# Patient Record
Sex: MALE | Race: WHITE | NOT HISPANIC OR LATINO | Employment: UNEMPLOYED | URBAN - METROPOLITAN AREA
[De-identification: names, ages, dates, MRNs, and addresses within clinical notes are randomized per-mention and may not be internally consistent; named-entity substitution may affect disease eponyms.]

---

## 2022-09-02 ENCOUNTER — HOSPITAL ENCOUNTER (EMERGENCY)
Facility: CLINIC | Age: 27
Discharge: HOME | End: 2022-09-02

## 2022-09-02 VITALS
WEIGHT: 250 LBS | TEMPERATURE: 98.9 F | RESPIRATION RATE: 16 BRPM | BODY MASS INDEX: 35.79 KG/M2 | HEART RATE: 74 BPM | OXYGEN SATURATION: 97 % | DIASTOLIC BLOOD PRESSURE: 83 MMHG | SYSTOLIC BLOOD PRESSURE: 149 MMHG | HEIGHT: 70 IN

## 2022-09-02 NOTE — ED TRIAGE NOTES
R hand swelling, redness extending up mid arm.  Was bit by a possible bee 2 days ago. States had temp of 100.1 yesterday.      Triage Assessment     Row Name 09/02/22 1227       Triage Assessment (Adult)    Airway WDL WDL       Respiratory WDL    Respiratory WDL WDL       Skin Circulation/Temperature WDL    Skin Circulation/Temperature WDL WDL       Cardiac WDL    Cardiac WDL WDL       Peripheral/Neurovascular WDL    Peripheral Neurovascular WDL WDL       Cognitive/Neuro/Behavioral WDL    Cognitive/Neuro/Behavioral WDL WDL

## 2023-08-15 ENCOUNTER — OFFICE VISIT (OUTPATIENT)
Dept: FAMILY MEDICINE | Facility: CLINIC | Age: 28
End: 2023-08-15
Payer: COMMERCIAL

## 2023-08-15 VITALS
RESPIRATION RATE: 16 BRPM | WEIGHT: 266 LBS | DIASTOLIC BLOOD PRESSURE: 79 MMHG | OXYGEN SATURATION: 96 % | SYSTOLIC BLOOD PRESSURE: 124 MMHG | TEMPERATURE: 97.9 F | BODY MASS INDEX: 38.17 KG/M2 | HEART RATE: 76 BPM

## 2023-08-15 DIAGNOSIS — A08.4 VIRAL GASTROENTERITIS: Primary | ICD-10-CM

## 2023-08-15 PROCEDURE — 99203 OFFICE O/P NEW LOW 30 MIN: CPT | Performed by: PHYSICIAN ASSISTANT

## 2023-08-15 RX ORDER — ONDANSETRON 4 MG/1
4 TABLET, ORALLY DISINTEGRATING ORAL EVERY 8 HOURS PRN
Qty: 9 TABLET | Refills: 0 | Status: SHIPPED | OUTPATIENT
Start: 2023-08-15 | End: 2023-08-18

## 2023-08-15 RX ORDER — HYDROXYZINE HYDROCHLORIDE 25 MG/1
50 TABLET, FILM COATED ORAL EVERY 8 HOURS PRN
COMMUNITY
Start: 2023-08-01 | End: 2023-12-11

## 2023-08-15 NOTE — PROGRESS NOTES
URGENT CARE VISIT:    SUBJECTIVE:   Kade Schaefer is a 28 year old male who presents with abdominal pain for 5-6 days. Abdominal pain is located over Generalized and is described as cramping. Pain timing/severity is described as gradual onset and mild and moderate.  Pain is improved by nothing and worsened by eating. Associated symptoms include nausea, vomiting, and diarrhea. He denies constipation, fever, and chills. He has tried pedialyte with no relief of symptoms.  Appetite is decreased. Risk factors include wife with same symptoms after eating out. Abdominal surgical history includes none.    PMH: History reviewed. No pertinent past medical history.  Allergies: Patient has no known allergies.  Medications:   Current Outpatient Medications   Medication Sig Dispense Refill    diclofenac (VOLTAREN) 1 % topical gel Apply 2 g topically 4 times daily      hydrOXYzine (ATARAX) 25 MG tablet Take 50 mg by mouth every 8 hours as needed for itching      omeprazole (PRILOSEC) 20 MG DR capsule Take 20 mg by mouth daily      ondansetron (ZOFRAN ODT) 4 MG ODT tab Take 1 tablet (4 mg) by mouth every 8 hours as needed for nausea 9 tablet 0     Social History:   Social History     Socioeconomic History    Marital status: Single     Spouse name: Not on file    Number of children: Not on file    Years of education: Not on file    Highest education level: Not on file   Occupational History    Not on file   Tobacco Use    Smoking status: Every Day    Smokeless tobacco: Current   Substance and Sexual Activity    Alcohol use: Yes     Comment: social    Drug use: Yes     Types: Marijuana    Sexual activity: Not on file   Other Topics Concern    Not on file   Social History Narrative    Not on file     Social Determinants of Health     Financial Resource Strain: Not on file   Food Insecurity: Not on file   Transportation Needs: Not on file   Physical Activity: Not on file   Stress: Not on file   Social Connections: Not on file    Intimate Partner Violence: Not on file   Housing Stability: Not on file       ROS: ROS otherwise found to be negative except as noted above.     OBJECTIVE:  /79   Pulse 76   Temp 97.9  F (36.6  C)   Resp 16   Wt 120.7 kg (266 lb)   SpO2 96%   BMI 38.17 kg/m    GENERAL APPEARANCE: healthy, alert and no distress  EYES: EOMI,  PERRL, conjunctiva clear  RESP: lungs clear to auscultation - no rales, rhonchi or wheezes  CV: regular rates and rhythm, normal S1 S2, no murmur noted  ABDOMEN: soft, normal bowel sounds, tenderness mild generalized  SKIN: no suspicious lesions or rashes      ASSESSMENT:     ICD-10-CM    1. Viral gastroenteritis  A08.4 ondansetron (ZOFRAN ODT) 4 MG ODT tab           PLAN:  Patient Instructions   Patient was educated on the natural course of condition which lasts about 7 days. Conservative measures discussed including drinking small amounts of fluids (Pedialyte or Gatorade/water mixture), bland diet, avoidance of dairy products, and analgesics (Tylenol) for pain. Advance diet as tolerated. To avoid transmission wash hands with soap/water and clean external house surfaces with bleach water. See your primary care provider if symptoms do not improve in 7 days. Seek emergency care if you develop worsening abdominal pain or fever over 103.  Patient verbalized understanding and is agreeable to plan. The patient was discharged ambulatory and in stable condition.    Katerine Arambula PA-C ....................  8/15/2023   11:00 AM

## 2023-08-15 NOTE — PATIENT INSTRUCTIONS
Patient was educated on the natural course of condition which lasts about 7 days. Conservative measures discussed including drinking small amounts of fluids (Pedialyte or Gatorade/water mixture), bland diet, avoidance of dairy products, and analgesics (Tylenol) for pain. Advance diet as tolerated. To avoid transmission wash hands with soap/water and clean external house surfaces with bleach water. See your primary care provider if symptoms do not improve in 7 days. Seek emergency care if you develop worsening abdominal pain or fever over 103.

## 2023-08-15 NOTE — LETTER
August 15, 2023      Kade Schaefer  [facility] [place] [address]        To Whom It May Concern:    Kade Schaefer  was seen on today.  Please excuse him from work for the next 1 to 3 days due to medical illness.         Sincerely,        Katerine Arambula PA-C

## 2023-08-21 ENCOUNTER — OFFICE VISIT (OUTPATIENT)
Dept: FAMILY MEDICINE | Facility: CLINIC | Age: 28
End: 2023-08-21
Payer: COMMERCIAL

## 2023-08-21 ENCOUNTER — TELEPHONE (OUTPATIENT)
Dept: INTERNAL MEDICINE | Facility: CLINIC | Age: 28
End: 2023-08-21
Payer: COMMERCIAL

## 2023-08-21 ENCOUNTER — NURSE TRIAGE (OUTPATIENT)
Dept: NURSING | Facility: CLINIC | Age: 28
End: 2023-08-21
Payer: COMMERCIAL

## 2023-08-21 VITALS
HEART RATE: 66 BPM | OXYGEN SATURATION: 99 % | SYSTOLIC BLOOD PRESSURE: 138 MMHG | RESPIRATION RATE: 16 BRPM | TEMPERATURE: 98.2 F | DIASTOLIC BLOOD PRESSURE: 84 MMHG

## 2023-08-21 DIAGNOSIS — R19.7 VOMITING AND DIARRHEA: Primary | ICD-10-CM

## 2023-08-21 DIAGNOSIS — R11.10 VOMITING AND DIARRHEA: Primary | ICD-10-CM

## 2023-08-21 PROCEDURE — 99212 OFFICE O/P EST SF 10 MIN: CPT | Performed by: PHYSICIAN ASSISTANT

## 2023-08-21 NOTE — TELEPHONE ENCOUNTER
Two weeks ago pt started feeling stomach pain after at [facility].  Nausea, vomiting and diarrhea.  Wife tested positive for ecoli.  Last week pt seen in OV. 8/15/23.  Pt states today he feels really nauseated and vomited this morning.  Pt feels he most likely has ecoli as well and would like to be seen and have a stool culture done.  Pt intends to go to [place], [place] today.  Maria Hinton RN  [facility] Nurse Advisor    Reason for Disposition   Patient wants to be seen    Additional Information   Negative: Shock suspected (e.g., cold/pale/clammy skin, too weak to stand, low BP, rapid pulse)   Negative: Difficult to awaken or acting confused (e.g., disoriented, slurred speech)   Negative: Sounds like a life-threatening emergency to the triager   Negative: Vomiting also present and worse than the diarrhea   Negative: Blood in stool and without diarrhea   Negative: SEVERE abdominal pain (e.g., excruciating) and present > 1 hour   Negative: SEVERE abdominal pain and age > 60 years   Negative: Bloody, black, or tarry bowel movements (Exception: chronic-unchanged black-grey bowel movements and is taking iron pills or Pepto-Bismol)   Negative: SEVERE diarrhea (e.g., 7 or more times / day more than normal) and age > 60 years   Negative: Constant abdominal pain lasting > 2 hours   Negative: Drinking very little and has signs of dehydration (e.g., no urine > 12 hours, very dry mouth, very lightheaded)   Negative: Patient sounds very sick or weak to the triager   Negative: SEVERE diarrhea (e.g., 7 or more times / day more than normal) and present > 24 hours (1 day)   Negative: MODERATE diarrhea (e.g., 4-6 times / day more than normal) and present > 48 hours (2 days)   Negative: MODERATE diarrhea (e.g., 4-6 times / day more than normal) and age > 70 years   Negative: Abdominal pain (Exception: Pain clears completely with each passage of diarrhea stool.)   Negative: Fever > 101 F (38.3 C)   Negative: Blood in the stool    Negative: Mucus or pus in stool has been present > 2 days and diarrhea is more than mild   Negative: Weak immune system (e.g., HIV positive, cancer chemo, splenectomy, organ transplant, chronic steroids)   Negative: Travel to a foreign country in past month   Negative: Recent antibiotic therapy (i.e., within last 2 months) and diarrhea present > 3 days since antibiotic was stopped   Negative: Recent hospitalization and diarrhea present > 3 days   Negative: Tube feedings (e.g., nasogastric, g-tube, j-tube)   Negative: MILD diarrhea (e.g., 1-3 or more stools than normal in past 24 hours) diarrhea without known cause and present > 7 days    Protocols used: Diarrhea-A-OH

## 2023-08-21 NOTE — TELEPHONE ENCOUNTER
Call and left message for patient that he would need to get seen before orders can be placed. He can see his primary care provider or he can come into UC. Callback number provided.    Madhavi Jaimes on 8/21/2023 at 11:48 AM

## 2023-08-21 NOTE — PROGRESS NOTES
"Patient presents with:  GI Problem: Was seen on 8/15 still having diarrhea and stomach pain wife was just dx with E coli so would like to be tested      Clinical Decision Making:  Patient requesting enteric stool study to rule out E. coli as his wife was recently diagnosed with it.  Lab was ordered today.  Patient is not dehydrated.      ICD-10-CM    1. Vomiting and diarrhea  R11.10 Enteric Bacteria and Virus Panel PCR    R19.7           Patient Instructions   1) Drink small but frequent sips of clear fluids such as water, Pedialyte, or G2 sports drink. I recommend G2 over original Gatorade because it has a lower sugar content.   2) Limit dairy products for 5-7 days.  3) Continue with a bland foods. BRAT diet is recommended which stands for: bananas, rice, applesauce, toast. Avoid spicy or greasy foods. Fruits that start with the letter \"P\" generally worsen diarrhea symptoms.   4) Complete stool study and drop off at one of our lab facilities.   5) Seek emergency medical attention if you  have concerns for sever dehydration. Symptoms of dehydration include severe fatigue, lightheadedness, dark colored urine, and very dry mouth. Dehydration can occur if you can't tolerate drinking fluids or if you're vomiting in addition to having diarrhea. There are some urgent cares that have IV fluids, so if you become dehydrated those are also an option.      HPI:  Kade Schaefer is a 28 year old male who presents today complaining of ongoing vomiting and diarrhea.  Patient was previously seen on 8/15 for this issue and was prescribed ondansetron.  His diarrhea symptoms have persisted and his wife was just diagnosed with E. coli.  Patient and his wife previously ate at a [email or web address] and symptoms started shortly thereafter.  Patient is interested in being tested for E. coli.    History obtained from the patient.    Problem List:  There are no relevant problems documented for this patient.      No past medical history on " file.    Social History     Tobacco Use    Smoking status: Every Day    Smokeless tobacco: Current   Substance Use Topics    Alcohol use: Yes     Comment: social       Review of Systems    Vitals:    08/21/23 1507   BP: 138/84   Pulse: 66   Resp: 16   Temp: 98.2  F (36.8  C)   TempSrc: Oral   SpO2: 99%       Physical Exam  Vitals and nursing note reviewed.   Constitutional:       General: He is not in acute distress.     Appearance: He is not toxic-appearing or diaphoretic.   HENT:      Head: Normocephalic and atraumatic.      Right Ear: External ear normal.      Left Ear: External ear normal.   Eyes:      Conjunctiva/sclera: Conjunctivae normal.   Pulmonary:      Effort: Pulmonary effort is normal. No respiratory distress.   Neurological:      Mental Status: He is alert.   Psychiatric:         Mood and Affect: Mood normal.         Behavior: Behavior normal.         Thought Content: Thought content normal.         Judgment: Judgment normal.       At the end of the encounter, I discussed results, diagnosis, medications. Discussed red flags for immediate return to clinic/ER, as well as indications for follow up if no improvement. Patient understood and agreed to plan. Patient was stable for discharge.

## 2023-08-21 NOTE — TELEPHONE ENCOUNTER
Order/Referral Request    Who is requesting: Spouse, Isabelle Fonseca    Orders being requested: Test kit for E-coli    Reason service is needed/diagnosis: Spouse had a test done and was positive for E.Coli so she wants the same test for patient.    When are orders needed by: As soon as possible    Has this been discussed with Provider: No    Does patient have a preference on a Group/Provider/Facility? No    Does patient have an appointment scheduled?: No, was seen in  for stomach pain and just wants the test ordered     Where to send orders: Place orders within Epic    Okay to leave a detailed message?: Yes at Cell number on file:    Telephone Information:   Mobile [phone] or wife Isabelle [phone]

## 2023-08-21 NOTE — PATIENT INSTRUCTIONS
"1) Drink small but frequent sips of clear fluids such as water, Pedialyte, or G2 sports drink. I recommend G2 over original Gatorade because it has a lower sugar content.   2) Limit dairy products for 5-7 days.  3) Continue with a bland foods. BRAT diet is recommended which stands for: bananas, rice, applesauce, toast. Avoid spicy or greasy foods. Fruits that start with the letter \"P\" generally worsen diarrhea symptoms.   4) Complete stool study and drop off at one of our lab facilities.   5) Seek emergency medical attention if you  have concerns for sever dehydration. Symptoms of dehydration include severe fatigue, lightheadedness, dark colored urine, and very dry mouth. Dehydration can occur if you can't tolerate drinking fluids or if you're vomiting in addition to having diarrhea. There are some urgent cares that have IV fluids, so if you become dehydrated those are also an option.    "

## 2023-08-22 ENCOUNTER — LAB (OUTPATIENT)
Dept: LAB | Facility: CLINIC | Age: 28
End: 2023-08-22
Payer: COMMERCIAL

## 2023-08-22 ENCOUNTER — TELEPHONE (OUTPATIENT)
Dept: FAMILY MEDICINE | Facility: CLINIC | Age: 28
End: 2023-08-22

## 2023-08-22 DIAGNOSIS — R19.7 VOMITING AND DIARRHEA: ICD-10-CM

## 2023-08-22 DIAGNOSIS — R11.10 VOMITING AND DIARRHEA: ICD-10-CM

## 2023-08-22 DIAGNOSIS — A09 DIARRHEA OF INFECTIOUS ORIGIN: Primary | ICD-10-CM

## 2023-08-22 DIAGNOSIS — A09 DIARRHEA OF INFECTIOUS ORIGIN: ICD-10-CM

## 2023-08-22 LAB
ADV 40+41 DNA STL QL NAA+NON-PROBE: NEGATIVE
ASTRO TYP 1-8 RNA STL QL NAA+NON-PROBE: NEGATIVE
C CAYETANENSIS DNA STL QL NAA+NON-PROBE: NEGATIVE
CAMPYLOBACTER DNA SPEC NAA+PROBE: NEGATIVE
CRYPTOSP DNA STL QL NAA+NON-PROBE: NEGATIVE
E COLI O157 DNA STL QL NAA+NON-PROBE: NORMAL
E HISTOLYT DNA STL QL NAA+NON-PROBE: NEGATIVE
EAEC ASTA GENE ISLT QL NAA+PROBE: NEGATIVE
EC STX1+STX2 GENES STL QL NAA+NON-PROBE: NEGATIVE
EPEC EAE GENE STL QL NAA+NON-PROBE: NEGATIVE
ETEC LTA+ST1A+ST1B TOX ST NAA+NON-PROBE: NEGATIVE
G LAMBLIA DNA STL QL NAA+NON-PROBE: NEGATIVE
NOROVIRUS GI+II RNA STL QL NAA+NON-PROBE: NEGATIVE
P SHIGELLOIDES DNA STL QL NAA+NON-PROBE: NEGATIVE
RVA RNA STL QL NAA+NON-PROBE: NEGATIVE
SALMONELLA SP RPOD STL QL NAA+PROBE: NEGATIVE
SAPO I+II+IV+V RNA STL QL NAA+NON-PROBE: NEGATIVE
SHIGELLA SP+EIEC IPAH ST NAA+NON-PROBE: NEGATIVE
V CHOLERAE DNA SPEC QL NAA+PROBE: NEGATIVE
VIBRIO DNA SPEC NAA+PROBE: NEGATIVE
Y ENTEROCOL DNA STL QL NAA+PROBE: NEGATIVE

## 2023-08-22 PROCEDURE — 87507 IADNA-DNA/RNA PROBE TQ 12-25: CPT

## 2023-08-22 NOTE — TELEPHONE ENCOUNTER
Miriam verduzco from infectious disease stating that pt has an incidental finding of C diff. They do not have the right methodology to test.     Suggestion is to order c diff PCR

## 2023-08-22 NOTE — TELEPHONE ENCOUNTER
Called patient and informed of concern for possible C. difficile.  Patient is still having symptoms.  No known risk factors for C. difficile given no recent antibiotics and recent hospitalization.  However, patient does work in a healthcare setting.  Ordered C. difficile PCR test as recommended and patient will pick this up at the [facility].  We will contact patient with further instructions if test is positive for appropriate treatment at that time as needed.  Answered all questions.  Patient was appreciative.

## 2023-08-23 ENCOUNTER — APPOINTMENT (OUTPATIENT)
Dept: LAB | Facility: CLINIC | Age: 28
End: 2023-08-23
Payer: COMMERCIAL

## 2023-08-23 LAB — C DIFF TOX B STL QL: NEGATIVE

## 2023-08-23 PROCEDURE — 87493 C DIFF AMPLIFIED PROBE: CPT

## 2023-08-23 NOTE — TELEPHONE ENCOUNTER
Pt calling, says someone called him but left no msg. Wondering if this is about this result that came in today? 08/23/23. Please call him back for additional info.

## 2023-08-24 NOTE — TELEPHONE ENCOUNTER
Called patient and discussed negative C. difficile test.  Patient still having nausea, occasional emesis, diarrhea.  He does note some slight improvement over the last few days.  Recommend he follow-up with primary care.  If symptoms worsen or new fevers, he should follow-up immediately in the emergency room or urgent care.  Patient expresses understanding and is appreciative.

## 2023-08-29 ENCOUNTER — TELEPHONE (OUTPATIENT)
Dept: URGENT CARE | Facility: URGENT CARE | Age: 28
End: 2023-08-29
Payer: COMMERCIAL

## 2023-08-29 NOTE — TELEPHONE ENCOUNTER
I have actually never seen this patient nor did I provide any note for him previously.  So I am not sure why it says these days were requested off by me previously.  I will however write the needed note for work as requested.  This should be available on patient's MyChart.

## 2023-08-29 NOTE — LETTER
[facility]  [address] [facility] [address]  Phone: [phone]    August 29, 2023        Kade Schaefer  [address] [place] [address]          To whom it may concern:    RE: Kade Schaefer    Patient should be excused from work for 8/23/2023 through 8/25/2023.  Please allow to return to work on 8/26/2023.    Please contact me for questions or concerns.      Sincerely,        Damarsi Gardner PA-C

## 2023-08-29 NOTE — TELEPHONE ENCOUNTER
General Call    Contacts         Type Contact Phone/Fax    08/29/2023 09:30 AM CDT Phone (Incoming) Kade Schaefer (Self) [phone] (H)          Reason for Call:  Patient called stating his office is requiring a work note for days off and date to return to work.  Days off were 8/23, 8/24, and 8/25 ordered by Damaris Gardner.      Please contact patient for details.  Patient is requesting a call back.    Okay to leave a detailed message?: No at Cell number on file:    Telephone Information:   Mobile [phone]

## 2023-08-30 NOTE — TELEPHONE ENCOUNTER
Pt called to see if his letter was ready for ? TC advised Pt that it was sent to him via [facility]. Pt states he does not have MyChart. Pt was wondering if he could  the letter at the [facility] clinic. TC printed the letter and left it at the  for PT .    Aneta Roman

## 2023-12-05 ENCOUNTER — OFFICE VISIT (OUTPATIENT)
Dept: FAMILY MEDICINE | Facility: CLINIC | Age: 28
End: 2023-12-05
Payer: COMMERCIAL

## 2023-12-05 VITALS
TEMPERATURE: 98.3 F | HEART RATE: 79 BPM | RESPIRATION RATE: 18 BRPM | BODY MASS INDEX: 37.18 KG/M2 | SYSTOLIC BLOOD PRESSURE: 126 MMHG | WEIGHT: 259.1 LBS | OXYGEN SATURATION: 97 % | DIASTOLIC BLOOD PRESSURE: 73 MMHG

## 2023-12-05 DIAGNOSIS — R07.0 THROAT PAIN: ICD-10-CM

## 2023-12-05 DIAGNOSIS — J01.10 ACUTE NON-RECURRENT FRONTAL SINUSITIS: Primary | ICD-10-CM

## 2023-12-05 PROCEDURE — 99213 OFFICE O/P EST LOW 20 MIN: CPT | Performed by: PHYSICIAN ASSISTANT

## 2023-12-05 ASSESSMENT — ENCOUNTER SYMPTOMS
COUGH: 1
NAUSEA: 1
SORE THROAT: 1
FEVER: 0
SINUS PAIN: 1
VOMITING: 0

## 2023-12-05 NOTE — LETTER
December 5, 2023      Kade Schaefer  [facility] [place] [address]        To Whom It May Concern:    Kade Schaefer  was seen on 12/5/23.  Please excuse him until 12/7/23 due to illness.        Sincerely,        Vianca Lozada PA-C

## 2023-12-05 NOTE — PATIENT INSTRUCTIONS
1.  Take antibiotic according to bottle instructions with food to avoid stomach upset.  If you are prone to stomach upset with antibiotics, I recommend adding a probiotic to this regimen.  Culturelle is a trusted brand.  2.  Adding a saline nasal spray or Flonase spray can also be helpful with clearing sinus congestion.  3.  Take Advil or Tylenol as needed for pain or fever control.  4.  Follow-up if you not having any improvement in your symptoms over the next 5 days.

## 2023-12-05 NOTE — PROGRESS NOTES
Patient presents with:  Ear Problem: Ear pain, sinus pressure, started 2wks, getting worse (mostly ear), no fever, light cough, chest discomfort and SOB, throat pain only when coughing      Clinical Decision Making:  Suspect bacterial sinusitis.  Patient started on Augmentin for treatment.      ICD-10-CM    1. Acute non-recurrent frontal sinusitis  J01.10 amoxicillin-clavulanate (AUGMENTIN) 875-125 MG tablet      2. Throat pain  R07.0 CANCELED: Streptococcus A Rapid Screen w/Reflex to PCR - Clinic Collect          Patient Instructions   1.  Take antibiotic according to bottle instructions with food to avoid stomach upset.  If you are prone to stomach upset with antibiotics, I recommend adding a probiotic to this regimen.  Culturelle is a trusted brand.  2.  Adding a saline nasal spray or Flonase spray can also be helpful with clearing sinus congestion.  3.  Take Advil or Tylenol as needed for pain or fever control.  4.  Follow-up if you not having any improvement in your symptoms over the next 5 days.      HPI:  Kade Schaefer is a 28 year old male who presents today complaining of sick symptoms for 15 days.  Patient experiencing right ear discomfort.  No fevers.  Patient has had a lots of postnasal drainage and frontal headaches and throat pain with coughing.    History obtained from the patient.    Problem List:  There are no relevant problems documented for this patient.      No past medical history on file.    Social History     Tobacco Use    Smoking status: Every Day    Smokeless tobacco: Current   Substance Use Topics    Alcohol use: Yes     Comment: social       Review of Systems   Constitutional:  Negative for fever.   HENT:  Positive for congestion, ear pain (right), postnasal drip, sinus pain (frontal) and sore throat (secondary to cough).    Respiratory:  Positive for cough.    Gastrointestinal:  Positive for nausea. Negative for vomiting.       Vitals:    12/05/23 1412   BP: 126/73   Pulse: 79    Resp: 18   Temp: 98.3  F (36.8  C)   TempSrc: Oral   SpO2: 97%   Weight: 117.5 kg (259 lb 1.6 oz)       Physical Exam  Vitals and nursing note reviewed.   Constitutional:       General: He is not in acute distress.     Appearance: He is not toxic-appearing or diaphoretic.   HENT:      Head: Normocephalic and atraumatic.      Right Ear: Tympanic membrane, ear canal and external ear normal.      Left Ear: Tympanic membrane, ear canal and external ear normal.      Mouth/Throat:      Mouth: Mucous membranes are moist.      Pharynx: Posterior oropharyngeal erythema present. No oropharyngeal exudate.   Eyes:      Conjunctiva/sclera: Conjunctivae normal.   Cardiovascular:      Rate and Rhythm: Normal rate and regular rhythm.      Heart sounds: No murmur heard.  Pulmonary:      Effort: Pulmonary effort is normal. No respiratory distress.      Breath sounds: Normal breath sounds.   Lymphadenopathy:      Cervical: No cervical adenopathy.   Neurological:      Mental Status: He is alert.   Psychiatric:         Mood and Affect: Mood normal.         Behavior: Behavior normal.         Thought Content: Thought content normal.         Judgment: Judgment normal.             At the end of the encounter, I discussed results, diagnosis, medications. Discussed red flags for immediate return to clinic/ER, as well as indications for follow up if no improvement. Patient understood and agreed to plan. Patient was stable for discharge.

## 2023-12-11 ENCOUNTER — OFFICE VISIT (OUTPATIENT)
Dept: FAMILY MEDICINE | Facility: CLINIC | Age: 28
End: 2023-12-11
Payer: COMMERCIAL

## 2023-12-11 VITALS
HEART RATE: 91 BPM | OXYGEN SATURATION: 97 % | BODY MASS INDEX: 37.16 KG/M2 | DIASTOLIC BLOOD PRESSURE: 90 MMHG | TEMPERATURE: 97.3 F | RESPIRATION RATE: 18 BRPM | SYSTOLIC BLOOD PRESSURE: 147 MMHG | WEIGHT: 259 LBS

## 2023-12-11 DIAGNOSIS — S61.210A LACERATION OF RIGHT INDEX FINGER WITHOUT FOREIGN BODY WITHOUT DAMAGE TO NAIL, INITIAL ENCOUNTER: Primary | ICD-10-CM

## 2023-12-11 PROCEDURE — 99213 OFFICE O/P EST LOW 20 MIN: CPT | Performed by: PHYSICIAN ASSISTANT

## 2023-12-11 RX ORDER — EPINEPHRINE 0.3 MG/.3ML
0.3 INJECTION SUBCUTANEOUS
COMMUNITY
Start: 2022-09-03

## 2023-12-11 RX ORDER — ACETAMINOPHEN 500 MG
500 TABLET ORAL
COMMUNITY
Start: 2023-02-08

## 2023-12-11 RX ORDER — IBUPROFEN 200 MG
400 TABLET ORAL
COMMUNITY
Start: 2023-02-08

## 2023-12-11 RX ORDER — LIDOCAINE 50 MG/G
1 OINTMENT TOPICAL
COMMUNITY
Start: 2023-08-01

## 2023-12-11 NOTE — PROGRESS NOTES
Chief Complaint   Patient presents with    Laceration     Right index finger cut on knife         ASSESSMENT/PLAN:  Kade was seen today for laceration.    Diagnoses and all orders for this visit:    Laceration of right index finger without foreign body without damage to nail, initial encounter    Semioval, approximately 1 cm in total length that was cleaned and no longer actively bleeding.  Skin glue applied  Wound care discussed    Kade Pham PA-C      SUBJECTIVE:  Kade is a 28 year old male who presents to urgent care with a cut to the tip of his right index finger on a kitchen knife today.    ROS: Pertinent ROS neg other than the symptoms noted above in the HPI.     OBJECTIVE:  BP (!) 147/90 (BP Location: Left arm, Patient Position: Sitting, Cuff Size: Adult Large)   Pulse 91   Temp 97.3  F (36.3  C) (Oral)   Resp 18   Wt 117.5 kg (259 lb)   SpO2 97%   BMI 37.16 kg/m     GENERAL: healthy, alert and no distress  MS: no gross musculoskeletal defects noted, no edema  SKIN: Partial-thickness semioval, approximately 1 cm in total length on tip of right index finger  NEURO: Normal strength and tone, mentation intact and speech normal    DIAGNOSTICS    No results found for any visits on 12/11/23.     Current Outpatient Medications   Medication    acetaminophen (TYLENOL) 500 MG tablet    amoxicillin-clavulanate (AUGMENTIN) 875-125 MG tablet    EPINEPHrine (ANY BX GENERIC EQUIV) 0.3 MG/0.3ML injection 2-pack    ibuprofen (ADVIL/MOTRIN) 200 MG tablet    lidocaine (XYLOCAINE) 5 % external ointment    omeprazole (PRILOSEC) 20 MG DR capsule     No current facility-administered medications for this visit.      There is no problem list on file for this patient.     No past medical history on file.  No past surgical history on file.  No family history on file.  Social History     Tobacco Use    Smoking status: Every Day    Smokeless tobacco: Current   Substance Use Topics    Alcohol use: Yes     Comment: social               The plan of care was discussed with the patient. They understand and agree with the course of treatment prescribed. A printed summary was given including instructions and medications.  The use of Dragon/IMRSV dictation services may have been used to construct the content in this note; any grammatical or spelling errors are non-intentional. Please contact the author of this note directly if you are in need of any clarification.

## 2023-12-11 NOTE — LETTER
December 11, 2023      Kade Schaefer  [facility] [place] [address]        To Whom It May Concern:    Kade Schaefer was seen in our clinic. Excuse him from handling dirty or contaminated material or fluids until 12/13/23      Sincerely,        Kade Pham PA-C